# Patient Record
Sex: FEMALE | Race: WHITE
[De-identification: names, ages, dates, MRNs, and addresses within clinical notes are randomized per-mention and may not be internally consistent; named-entity substitution may affect disease eponyms.]

---

## 2019-10-31 ENCOUNTER — HOSPITAL ENCOUNTER (OUTPATIENT)
Dept: HOSPITAL 61 - KCIC | Age: 34
Discharge: HOME | End: 2019-10-31
Attending: CHIROPRACTOR
Payer: COMMERCIAL

## 2019-10-31 DIAGNOSIS — Z98.51: ICD-10-CM

## 2019-10-31 DIAGNOSIS — Z86.73: ICD-10-CM

## 2019-10-31 DIAGNOSIS — M25.552: Primary | ICD-10-CM

## 2019-10-31 PROCEDURE — 73525 CONTRAST X-RAY OF HIP: CPT

## 2019-10-31 PROCEDURE — 73722 MRI JOINT OF LWR EXTR W/DYE: CPT
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
